# Patient Record
Sex: MALE | Race: BLACK OR AFRICAN AMERICAN | NOT HISPANIC OR LATINO | URBAN - METROPOLITAN AREA
[De-identification: names, ages, dates, MRNs, and addresses within clinical notes are randomized per-mention and may not be internally consistent; named-entity substitution may affect disease eponyms.]

---

## 2020-09-01 ENCOUNTER — EMERGENCY (EMERGENCY)
Facility: HOSPITAL | Age: 25
LOS: 0 days | Discharge: HOME | End: 2020-09-01
Payer: COMMERCIAL

## 2020-09-01 VITALS
RESPIRATION RATE: 17 BRPM | HEART RATE: 100 BPM | WEIGHT: 240.08 LBS | TEMPERATURE: 99 F | DIASTOLIC BLOOD PRESSURE: 77 MMHG | SYSTOLIC BLOOD PRESSURE: 124 MMHG | OXYGEN SATURATION: 96 % | HEIGHT: 73 IN

## 2020-09-01 DIAGNOSIS — S82.434A NONDISPLACED OBLIQUE FRACTURE OF SHAFT OF RIGHT FIBULA, INITIAL ENCOUNTER FOR CLOSED FRACTURE: ICD-10-CM
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
## 2020-09-01 DIAGNOSIS — M25.571 PAIN IN RIGHT ANKLE AND JOINTS OF RIGHT FOOT: ICD-10-CM
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
## 2020-09-01 PROCEDURE — 73610 X-RAY EXAM OF ANKLE: CPT | Mod: 26,RT,76

## 2020-09-01 PROCEDURE — 73600 X-RAY EXAM OF ANKLE: CPT | Mod: 26,59,76,RT

## 2020-09-01 PROCEDURE — 73590 X-RAY EXAM OF LOWER LEG: CPT | Mod: 26,RT

## 2020-09-01 PROCEDURE — 99284 EMERGENCY DEPT VISIT MOD MDM: CPT

## 2020-09-01 RX ORDER — IBUPROFEN 200 MG
1 TABLET ORAL
Qty: 15 | Refills: 0
Start: 2020-09-01 | End: 2020-09-05

## 2020-09-01 RX ORDER — IBUPROFEN 200 MG
800 TABLET ORAL ONCE
Refills: 0 | Status: COMPLETED | OUTPATIENT
Start: 2020-09-01 | End: 2020-09-01

## 2020-09-01 RX ADMIN — Medication 800 MILLIGRAM(S): at 19:20

## 2020-09-01 NOTE — CONSULT NOTE ADULT - SUBJECTIVE AND OBJECTIVE BOX
Orthopedic Surgery Consult Note    25yMale p/w R ankle pain/deformity and inability to bear weight s/p mechanicl fall. Denies headstrike/LOC. Denies numbness/tingling in the feet/toes. No other bone or joint complaints.       Vital Signs Last 24 Hrs  T(C): 37.2 (09-01-20 @ 18:28), Max: 37.2 (09-01-20 @ 18:28)  T(F): 99 (09-01-20 @ 18:28), Max: 99 (09-01-20 @ 18:28)  HR: 100 (09-01-20 @ 18:28) (100 - 100)  BP: 124/77 (09-01-20 @ 18:28) (124/77 - 124/77)  BP(mean): --  RR: 17 (09-01-20 @ 18:28) (17 - 17)  SpO2: 96% (09-01-20 @ 18:28) (96% - 96%)    Physical Exam  Gen: Nad  R LE: Skin intact, +skin tenting medially +TTP midshaft fibula and ankle joint b/l   motor intact distally  SILT L3-S1  2+ DP    Imaging:  XR showing R maisonneuve ankle fracture    Procedure: Closed reduction performed followed by placement of a well padded trilam splint. Patient tolerated the procedure well. Post procedure imaging obtained and showed improved alignment. Post procedure the patient was NV intact. Orthopedic Surgery Consult Note    25yMale p/w R ankle pain/deformity and inability to bear weight s/p mechanicl fall. Patient playing basketball and twisted her left ankle. Denies headstrike/LOC. Denies numbness/tingling in the feet/toes. No other bone or joint complaints.       Vital Signs Last 24 Hrs  T(C): 37.2 (09-01-20 @ 18:28), Max: 37.2 (09-01-20 @ 18:28)  T(F): 99 (09-01-20 @ 18:28), Max: 99 (09-01-20 @ 18:28)  HR: 100 (09-01-20 @ 18:28) (100 - 100)  BP: 124/77 (09-01-20 @ 18:28) (124/77 - 124/77)  BP(mean): --  RR: 17 (09-01-20 @ 18:28) (17 - 17)  SpO2: 96% (09-01-20 @ 18:28) (96% - 96%)    Physical Exam  Gen: Nad  R LE: Skin intact, + ecchymosis of medial and lateral malleoli, +TTP midshaft fibula and ankle joint b/l   Nonpainful ankle ROM  + EHL/TA/GSC/FHL  SILT L3-S1  2+ DP     Secondary Survey: No TTP over bony prominences, SILT, palpable pulses, full/painless range of motion, compartments soft     Imaging:  XR showing R midshaft fibula fracture    stress radiograph demonstrating medial widening    Procedure: Closed reduction performed followed by placement of a well padded trilam splint. Patient tolerated the procedure well. Post procedure imaging obtained and showed improved alignment. Post procedure the patient was NV intact.

## 2020-09-01 NOTE — ED ADULT NURSE NOTE - OBJECTIVE STATEMENT
Pt c/o 7/10 non-radiating rt ankle throbbing pain post fall while playing basketball, aggravated by pressure.  Pt denies LOC/head injury/chest pain/difficulty breathing/ abd pain/dysuria.

## 2020-09-01 NOTE — ED PROVIDER NOTE - MUSCULOSKELETAL, MLM
Spine appears normal, range of motion is not limited, no muscle or joint tenderness RIGHT LOWER LEG- +PAIN ON ROM, GOOD PUSLE AND SENSORY, NO ERYTHMEA, NO ST S

## 2020-09-01 NOTE — ED PROVIDER NOTE - OBJECTIVE STATEMENT
This is a 24 yo m w pmhx of dagmar c/o of right lower leg pain s/p fall x 1 day  Denies loc, head trauma, neck pain, chest pain, blood thinners, neuro deficit.

## 2020-09-01 NOTE — ED PROVIDER NOTE - PATIENT PORTAL LINK FT
You can access the FollowMyHealth Patient Portal offered by [facility] by registering at the following website: [email or web address]. By joining [employer]’s FollowMyHealth portal, you will also be able to view your health information using other applications (apps) compatible with our system.

## 2020-09-01 NOTE — CONSULT NOTE ADULT - ASSESSMENT
A/P: 25yMale with R maalneuve ankle fracture s/p closed reduction and splinting  - Pain control  - NWB on affected extremity in splint  - Keep splint clean, dry and intact until follow up  - Cane/crutches/walker as needed  - Ice/elevation  - Pt states he has an orthopedic at home he will follow up with, but pt can also  - Follow up with Dr. Olmedo in 1 week, call office for appointment A/P: 25yMale with R majeremíasuve ankle fracture s/p closed reduction and splinting  - Pain control  - NWB on affected extremity in splint  - Keep splint clean, dry and intact until follow up  - Cane/crutches/walker as needed  - Ice/elevation  - Follow up with Dr. Olmedo in 1 week, call office for appointment

## 2020-09-01 NOTE — ED PROVIDER NOTE - CLINICAL SUMMARY MEDICAL DECISION MAKING FREE TEXT BOX
REST, ICE, PAIN MEDS AND F/U W ORTHOPEDICS IN DR GARCIA IN 1WEEK  JUVENTINO  Discussed results and outcome of testing with the patient.  Patient advised to please follow up with their primary care doctor within the next 24-48 hours and return to the Emergency Department for worsening symptoms or any other concerns.  Patient advised that their doctor may call  to follow up on the specific results of the tests performed today in the emergency department.

## 2024-01-01 NOTE — ED PROVIDER NOTE - CARE PLAN
Principal Discharge DX:	Closed nondisplaced oblique fracture of shaft of right fibula, initial encounter
Statement Selected